# Patient Record
Sex: FEMALE | Race: BLACK OR AFRICAN AMERICAN | NOT HISPANIC OR LATINO
[De-identification: names, ages, dates, MRNs, and addresses within clinical notes are randomized per-mention and may not be internally consistent; named-entity substitution may affect disease eponyms.]

---

## 2017-02-23 ENCOUNTER — APPOINTMENT (OUTPATIENT)
Dept: ENDOCRINOLOGY | Facility: CLINIC | Age: 57
End: 2017-02-23

## 2017-02-23 VITALS
BODY MASS INDEX: 34.55 KG/M2 | DIASTOLIC BLOOD PRESSURE: 84 MMHG | WEIGHT: 195 LBS | HEART RATE: 80 BPM | HEIGHT: 63 IN | SYSTOLIC BLOOD PRESSURE: 140 MMHG

## 2017-02-23 RX ORDER — LANCETS 33 GAUGE
EACH MISCELLANEOUS
Qty: 100 | Refills: 5 | Status: ACTIVE | COMMUNITY
Start: 2017-02-23 | End: 1900-01-01

## 2017-02-23 RX ORDER — BLOOD SUGAR DIAGNOSTIC
STRIP MISCELLANEOUS TWICE DAILY
Qty: 1 | Refills: 5 | Status: ACTIVE | COMMUNITY
Start: 2017-02-23 | End: 1900-01-01

## 2017-09-07 ENCOUNTER — RX RENEWAL (OUTPATIENT)
Age: 57
End: 2017-09-07

## 2017-10-09 ENCOUNTER — APPOINTMENT (OUTPATIENT)
Dept: ENDOCRINOLOGY | Facility: CLINIC | Age: 57
End: 2017-10-09
Payer: COMMERCIAL

## 2017-10-09 VITALS
BODY MASS INDEX: 35.79 KG/M2 | WEIGHT: 202 LBS | DIASTOLIC BLOOD PRESSURE: 78 MMHG | SYSTOLIC BLOOD PRESSURE: 132 MMHG | HEIGHT: 63 IN | HEART RATE: 78 BPM

## 2017-10-09 LAB — GLUCOSE BLDC GLUCOMTR-MCNC: 101

## 2017-10-09 PROCEDURE — 99214 OFFICE O/P EST MOD 30 MIN: CPT | Mod: 25

## 2017-10-09 PROCEDURE — 82962 GLUCOSE BLOOD TEST: CPT

## 2017-11-10 ENCOUNTER — MOBILE ON CALL (OUTPATIENT)
Age: 57
End: 2017-11-10

## 2018-03-16 ENCOUNTER — RX RENEWAL (OUTPATIENT)
Age: 58
End: 2018-03-16

## 2018-04-09 ENCOUNTER — APPOINTMENT (OUTPATIENT)
Dept: ENDOCRINOLOGY | Facility: CLINIC | Age: 58
End: 2018-04-09
Payer: COMMERCIAL

## 2018-04-09 ENCOUNTER — TRANSCRIPTION ENCOUNTER (OUTPATIENT)
Age: 58
End: 2018-04-09

## 2018-04-09 VITALS
BODY MASS INDEX: 36.14 KG/M2 | HEART RATE: 79 BPM | DIASTOLIC BLOOD PRESSURE: 82 MMHG | WEIGHT: 204 LBS | HEIGHT: 63 IN | SYSTOLIC BLOOD PRESSURE: 145 MMHG | OXYGEN SATURATION: 97 %

## 2018-04-09 LAB — GLUCOSE BLDC GLUCOMTR-MCNC: 83

## 2018-04-09 PROCEDURE — 82962 GLUCOSE BLOOD TEST: CPT

## 2018-04-09 PROCEDURE — 99214 OFFICE O/P EST MOD 30 MIN: CPT | Mod: 25

## 2018-06-12 ENCOUNTER — RX RENEWAL (OUTPATIENT)
Age: 58
End: 2018-06-12

## 2018-10-08 ENCOUNTER — MEDICATION RENEWAL (OUTPATIENT)
Age: 58
End: 2018-10-08

## 2019-01-17 ENCOUNTER — APPOINTMENT (OUTPATIENT)
Dept: ENDOCRINOLOGY | Facility: CLINIC | Age: 59
End: 2019-01-17
Payer: COMMERCIAL

## 2019-01-17 VITALS
SYSTOLIC BLOOD PRESSURE: 139 MMHG | WEIGHT: 206 LBS | OXYGEN SATURATION: 99 % | DIASTOLIC BLOOD PRESSURE: 74 MMHG | BODY MASS INDEX: 36.5 KG/M2 | HEART RATE: 84 BPM | HEIGHT: 63 IN

## 2019-01-17 LAB — GLUCOSE BLDC GLUCOMTR-MCNC: 88

## 2019-01-17 PROCEDURE — 82962 GLUCOSE BLOOD TEST: CPT

## 2019-01-17 PROCEDURE — 99214 OFFICE O/P EST MOD 30 MIN: CPT | Mod: 25

## 2019-01-17 NOTE — ASSESSMENT
[Carbohydrate Consistent Diet] : carbohydrate consistent diet [Importance of Diet and Exercise] : importance of diet and exercise to improve glycemic control, achieve weight loss and improve cardiovascular health [Levothyroxine] : The patient was instructed to take Levothyroxine on an empty stomach, separate from vitamins, and wait at least 30 minutes before eating [FreeTextEntry1] :  She will remain on the same dose of Synthroid and continue taking vitamin D every 2 weeks. We did talk about the fact that her A1c was 6.4.which is  significant  for impaired glucose tolerance. She'll continue to follow a healthy lifestyle to try to incorporate more exercise and be more careful with her carbohydrate intake.We did discuss GLP 1 agonists as a help for weight loss or starting metformin; she prefers to hold off any meds.  \par Will to low dose DST to r/o [place].

## 2019-01-17 NOTE — PHYSICAL EXAM
[Alert] : alert [No Acute Distress] : no acute distress [Well Nourished] : well nourished [Well Developed] : well developed [Normal Sclera/Conjunctiva] : normal sclera/conjunctiva [EOMI] : extra ocular movement intact [No Proptosis] : no proptosis [Normal Oropharynx] : the oropharynx was normal [Thyroid Not Enlarged] : the thyroid was not enlarged [No Thyroid Nodules] : there were no palpable thyroid nodules [No Respiratory Distress] : no respiratory distress [No Accessory Muscle Use] : no accessory muscle use [Clear to Auscultation] : lungs were clear to auscultation bilaterally [Normal Rate] : heart rate was normal  [Normal S1, S2] : normal S1 and S2 [Regular Rhythm] : with a regular rhythm [Pedal Pulses Normal] : the pedal pulses are present [No Edema] : there was no peripheral edema [Normal Bowel Sounds] : normal bowel sounds [Not Tender] : non-tender [Soft] : abdomen soft [Not Distended] : not distended [Normal Gait] : normal gait [Normal Strength/Tone] : muscle strength and tone were normal [No Rash] : no rash [Normal Reflexes] : deep tendon reflexes were 2+ and symmetric [No Tremors] : no tremors [Oriented x3] : oriented to person, place, and time [Acanthosis Nigricans] : no acanthosis nigricans [de-identified] : dorsal hump

## 2019-01-17 NOTE — HISTORY OF PRESENT ILLNESS
[FreeTextEntry1] : Patient with hypothyroidism, she is compliant with her medication.She is also taking vitamin D supplementation every 2 weeks. 6 months ago her current A1c was 6.0 and  since then she has been more careful with her diet especially of carbohydrate intake since then.She tries to follow a healthy lifestyle.However her present A1c is now  6.4. Her thyroid level was normal.

## 2019-01-23 ENCOUNTER — APPOINTMENT (OUTPATIENT)
Dept: SPINE | Facility: CLINIC | Age: 59
End: 2019-01-23

## 2019-04-19 ENCOUNTER — RX RENEWAL (OUTPATIENT)
Age: 59
End: 2019-04-19

## 2019-07-08 ENCOUNTER — MEDICATION RENEWAL (OUTPATIENT)
Age: 59
End: 2019-07-08

## 2019-07-08 ENCOUNTER — RX RENEWAL (OUTPATIENT)
Age: 59
End: 2019-07-08

## 2019-09-12 ENCOUNTER — APPOINTMENT (OUTPATIENT)
Dept: ENDOCRINOLOGY | Facility: CLINIC | Age: 59
End: 2019-09-12
Payer: COMMERCIAL

## 2019-09-12 VITALS
WEIGHT: 198.13 LBS | BODY MASS INDEX: 35.11 KG/M2 | SYSTOLIC BLOOD PRESSURE: 100 MMHG | HEIGHT: 63 IN | DIASTOLIC BLOOD PRESSURE: 74 MMHG | HEART RATE: 91 BPM | OXYGEN SATURATION: 95 %

## 2019-09-12 PROCEDURE — 99214 OFFICE O/P EST MOD 30 MIN: CPT

## 2019-09-12 NOTE — PHYSICAL EXAM
[Alert] : alert [Well Nourished] : well nourished [No Acute Distress] : no acute distress [Normal Sclera/Conjunctiva] : normal sclera/conjunctiva [Well Developed] : well developed [No Proptosis] : no proptosis [EOMI] : extra ocular movement intact [Normal Oropharynx] : the oropharynx was normal [Thyroid Not Enlarged] : the thyroid was not enlarged [No Respiratory Distress] : no respiratory distress [No Thyroid Nodules] : there were no palpable thyroid nodules [Clear to Auscultation] : lungs were clear to auscultation bilaterally [No Accessory Muscle Use] : no accessory muscle use [Normal Rate] : heart rate was normal  [Normal S1, S2] : normal S1 and S2 [Regular Rhythm] : with a regular rhythm [No Edema] : there was no peripheral edema [Pedal Pulses Normal] : the pedal pulses are present [Normal Bowel Sounds] : normal bowel sounds [Not Tender] : non-tender [Not Distended] : not distended [Soft] : abdomen soft [Normal Gait] : normal gait [Normal Strength/Tone] : muscle strength and tone were normal [No Rash] : no rash [Acanthosis Nigricans] : no acanthosis nigricans [No Tremors] : no tremors [Normal Reflexes] : deep tendon reflexes were 2+ and symmetric [Oriented x3] : oriented to person, place, and time [de-identified] : dorsal hump

## 2019-09-12 NOTE — ASSESSMENT
[FreeTextEntry1] :  She will remain on the same dose of Synthroid and continue taking vitamin D every 2 weeks. We did talk about the fact that her A1c was 6.1.which is c/w  impaired glucose tolerance. She'll continue to follow a healthy lifestyle to try to incorporate more exercise and be more careful with her carbohydrate intake.We did discuss GLP 1 agonists as a help for weight loss or starting metformin; she prefers to hold off any meds.  Will monitor FBS periodically. \par  [Importance of Diet and Exercise] : importance of diet and exercise to improve glycemic control, achieve weight loss and improve cardiovascular health [Carbohydrate Consistent Diet] : carbohydrate consistent diet [Levothyroxine] : The patient was instructed to take Levothyroxine on an empty stomach, separate from vitamins, and wait at least 30 minutes before eating

## 2019-09-12 NOTE — HISTORY OF PRESENT ILLNESS
[FreeTextEntry1] : Patient with hypothyroidism, she is compliant with her medication.She is also taking vitamin D supplementation every 2 weeks. 6 months ago her current A1c was 6.4 and  since then she has been more careful with her diet especially of carbohydrate intake since then.She tries to follow a healthy lifestyle.Her present A1c is now  6.1. Her thyroid level was normal. she has lost some weight eater less carbs.

## 2020-03-12 ENCOUNTER — APPOINTMENT (OUTPATIENT)
Dept: ENDOCRINOLOGY | Facility: CLINIC | Age: 60
End: 2020-03-12
Payer: COMMERCIAL

## 2020-03-12 VITALS
OXYGEN SATURATION: 95 % | HEART RATE: 84 BPM | SYSTOLIC BLOOD PRESSURE: 142 MMHG | WEIGHT: 197 LBS | DIASTOLIC BLOOD PRESSURE: 81 MMHG | HEIGHT: 63 IN | BODY MASS INDEX: 34.91 KG/M2

## 2020-03-12 LAB — GLUCOSE BLDC GLUCOMTR-MCNC: 101

## 2020-03-12 PROCEDURE — 99213 OFFICE O/P EST LOW 20 MIN: CPT | Mod: 25

## 2020-03-12 PROCEDURE — 82962 GLUCOSE BLOOD TEST: CPT | Mod: NC

## 2020-03-12 NOTE — PHYSICAL EXAM
[Alert] : alert [No Acute Distress] : no acute distress [Well Nourished] : well nourished [Well Developed] : well developed [Normal Sclera/Conjunctiva] : normal sclera/conjunctiva [EOMI] : extra ocular movement intact [No Proptosis] : no proptosis [Normal Oropharynx] : the oropharynx was normal [Thyroid Not Enlarged] : the thyroid was not enlarged [No Thyroid Nodules] : there were no palpable thyroid nodules [No Respiratory Distress] : no respiratory distress [No Accessory Muscle Use] : no accessory muscle use [Clear to Auscultation] : lungs were clear to auscultation bilaterally [Normal Rate] : heart rate was normal  [Normal S1, S2] : normal S1 and S2 [Regular Rhythm] : with a regular rhythm [Pedal Pulses Normal] : the pedal pulses are present [No Edema] : there was no peripheral edema [Normal Bowel Sounds] : normal bowel sounds [Not Tender] : non-tender [Soft] : abdomen soft [Not Distended] : not distended [Normal Gait] : normal gait [Normal Strength/Tone] : muscle strength and tone were normal [No Rash] : no rash [Acanthosis Nigricans] : no acanthosis nigricans [Normal Reflexes] : deep tendon reflexes were 2+ and symmetric [No Tremors] : no tremors [Oriented x3] : oriented to person, place, and time [de-identified] : dorsal hump

## 2020-03-12 NOTE — ASSESSMENT
[FreeTextEntry1] :  She will remain on the same dose of Synthroid and continue taking vitamin D every 2 weeks. We did talk about the fact that her A1c was 6.1.which is c/w  impaired glucose tolerance. She'll continue to follow a healthy lifestyle to try to incorporate more exercise and be more careful with her carbohydrate intake.We did discuss GLP 1 agonists as a help for weight loss or starting metformin; she prefers to hold off any meds.  Will monitor FBS periodically. \par  [Carbohydrate Consistent Diet] : carbohydrate consistent diet [Importance of Diet and Exercise] : importance of diet and exercise to improve glycemic control, achieve weight loss and improve cardiovascular health [Levothyroxine] : The patient was instructed to take Levothyroxine on an empty stomach, separate from vitamins, and wait at least 30 minutes before eating

## 2020-04-17 ENCOUNTER — RX RENEWAL (OUTPATIENT)
Age: 60
End: 2020-04-17

## 2020-05-04 ENCOUNTER — RX RENEWAL (OUTPATIENT)
Age: 60
End: 2020-05-04

## 2020-08-17 ENCOUNTER — RX RENEWAL (OUTPATIENT)
Age: 60
End: 2020-08-17

## 2020-09-15 ENCOUNTER — APPOINTMENT (OUTPATIENT)
Dept: ENDOCRINOLOGY | Facility: CLINIC | Age: 60
End: 2020-09-15
Payer: COMMERCIAL

## 2020-09-15 PROCEDURE — 99213 OFFICE O/P EST LOW 20 MIN: CPT | Mod: 95

## 2020-09-15 NOTE — ASSESSMENT
[Carbohydrate Consistent Diet] : carbohydrate consistent diet [Importance of Diet and Exercise] : importance of diet and exercise to improve glycemic control, achieve weight loss and improve cardiovascular health [Levothyroxine] : The patient was instructed to take Levothyroxine on an empty stomach, separate from vitamins, and wait at least 30 minutes before eating [FreeTextEntry1] :  She will remain on the same dose of Synthroid and continue taking vitamin D every 2 weeks. We did talk about the fact that her A1c was 6.2.which is c/w  impaired glucose tolerance. She'll continue to follow a healthy lifestyle to try to incorporate more exercise and be more careful with her carbohydrate intake.We did discuss GLP 1 agonists as a help for weight loss or starting metformin; she prefers to hold off any meds.  Will monitor FBS periodically. \par

## 2020-09-15 NOTE — PHYSICAL EXAM
[Alert] : alert [No Acute Distress] : no acute distress [Normal Voice/Communication] : normal voice communication [Normal Sclera/Conjunctiva] : normal sclera/conjunctiva [No Proptosis] : no proptosis [Thyroid Not Enlarged] : the thyroid was not enlarged [No Respiratory Distress] : no respiratory distress [No Rash] : no rash [Oriented x3] : oriented to person, place, and time [Normal Insight/Judgement] : insight and judgment were intact [de-identified] : Limited exam due to Telehealth visit secondary to Covid pandemic

## 2020-09-15 NOTE — HISTORY OF PRESENT ILLNESS
[Home] : at home, [unfilled] , at the time of the visit. [Medical Office: ([facility])___] : at the medical office located in  [Verbal consent obtained from patient] : the patient, [unfilled] [FreeTextEntry1] : Patient with hypothyroidism, she is compliant with her medication.She is also taking vitamin D supplementation every 2 weeks. 6 months ago her  A1c was 6.4 and  since then she has been more careful with her diet especially of carbohydrate intake since then.She tries to follow a healthy lifestyle.Her present A1c is now  6.2. Her thyroid level was normal. she has lost some weight eating less carbs.

## 2020-09-16 ENCOUNTER — RX RENEWAL (OUTPATIENT)
Age: 60
End: 2020-09-16

## 2020-09-21 ENCOUNTER — RX RENEWAL (OUTPATIENT)
Age: 60
End: 2020-09-21

## 2020-11-20 ENCOUNTER — RX RENEWAL (OUTPATIENT)
Age: 60
End: 2020-11-20

## 2020-11-27 ENCOUNTER — RX RENEWAL (OUTPATIENT)
Age: 60
End: 2020-11-27

## 2021-01-19 ENCOUNTER — APPOINTMENT (OUTPATIENT)
Dept: ENDOCRINOLOGY | Facility: CLINIC | Age: 61
End: 2021-01-19

## 2021-03-08 ENCOUNTER — APPOINTMENT (OUTPATIENT)
Dept: ENDOCRINOLOGY | Facility: CLINIC | Age: 61
End: 2021-03-08
Payer: COMMERCIAL

## 2021-03-08 PROCEDURE — 99213 OFFICE O/P EST LOW 20 MIN: CPT | Mod: 95

## 2021-03-08 RX ORDER — BIMATOPROST 0.1 MG/ML
0.01 SOLUTION/ DROPS OPHTHALMIC
Qty: 2 | Refills: 0 | Status: ACTIVE | COMMUNITY
Start: 2020-12-15

## 2021-03-08 RX ORDER — SODIUM SULFATE, POTASSIUM SULFATE, MAGNESIUM SULFATE 17.5; 3.13; 1.6 G/ML; G/ML; G/ML
17.5-3.13-1.6 SOLUTION, CONCENTRATE ORAL
Qty: 354 | Refills: 0 | Status: ACTIVE | COMMUNITY
Start: 2021-01-15

## 2021-03-08 NOTE — HISTORY OF PRESENT ILLNESS
[FreeTextEntry1] : Patient with hypothyroidism, she is compliant with her medication.She is also taking vitamin D supplementation every 2 weeks. One year ago  her  A1c was 6.4 and  since then she has been more careful with her diet especially of carbohydrate intake since then.She tries to follow a healthy lifestyle.Her present A1c is now  6.2. Her thyroid level was normal. she has lost some weight eating less carbs. Did not have vaccine.

## 2021-03-08 NOTE — ASSESSMENT
[FreeTextEntry1] :  She will remain on the same dose of Synthroid and continue taking vitamin D every 2 weeks. We did talk about the fact that her A1c was 6.2.which is c/w  impaired glucose tolerance. She'll continue to follow a healthy lifestyle to try to incorporate more exercise and be more careful with her carbohydrate intake.We did discuss GLP 1 agonists as a help for weight loss or starting metformin; she prefers to hold off any meds.  Will monitor FBS periodically. \par Meds renewed. f/u 6 months

## 2021-03-08 NOTE — REASON FOR VISIT
[Home] : at home, [unfilled] , at the time of the visit. [Medical Office: ([facility])___] : at the medical office located in  [Verbal consent obtained from patient] : the patient, [unfilled] [Follow - Up] : a follow-up visit [Hypothyroidism] : hypothyroidism [Weight Management/Obesity] : weight management/obesity

## 2021-03-08 NOTE — PHYSICAL EXAM
[Alert] : alert [No Acute Distress] : no acute distress [Normal Voice/Communication] : normal voice communication [Normal Sclera/Conjunctiva] : normal sclera/conjunctiva [No Proptosis] : no proptosis [Thyroid Not Enlarged] : the thyroid was not enlarged [No Respiratory Distress] : no respiratory distress [No Rash] : no rash [Oriented x3] : oriented to person, place, and time [Normal Insight/Judgement] : insight and judgment were intact [de-identified] : Limited exam due to Telehealth visit secondary to Covid pandemic

## 2021-06-17 ENCOUNTER — RX RENEWAL (OUTPATIENT)
Age: 61
End: 2021-06-17

## 2021-09-09 ENCOUNTER — APPOINTMENT (OUTPATIENT)
Dept: ENDOCRINOLOGY | Facility: CLINIC | Age: 61
End: 2021-09-09
Payer: COMMERCIAL

## 2021-09-09 PROCEDURE — 99213 OFFICE O/P EST LOW 20 MIN: CPT | Mod: 95

## 2021-09-09 NOTE — PHYSICAL EXAM
[Alert] : alert [No Acute Distress] : no acute distress [Normal Voice/Communication] : normal voice communication [Normal Sclera/Conjunctiva] : normal sclera/conjunctiva [No Proptosis] : no proptosis [Thyroid Not Enlarged] : the thyroid was not enlarged [No Respiratory Distress] : no respiratory distress [No Rash] : no rash [Oriented x3] : oriented to person, place, and time [Normal Insight/Judgement] : insight and judgment were intact [de-identified] : Limited exam due to Telehealth visit secondary to Covid pandemic

## 2021-09-09 NOTE — ASSESSMENT
[FreeTextEntry1] :  She will remain on the same dose of Synthroid and continue taking vitamin D every 2 weeks. We did talk about the fact that her A1c was 6.2.which is c/w  impaired glucose tolerance. She'll continue to follow a healthy lifestyle to try to incorporate more exercise and be more careful with her carbohydrate intake.We did discuss GLP 1 agonists as a help for weight loss or starting metformin; she prefers to hold off any meds.  Will monitor FBS periodically. \par Meds renewed. f/u 6 months\par New labs still pending. [Carbohydrate Consistent Diet] : carbohydrate consistent diet [Importance of Diet and Exercise] : importance of diet and exercise to improve glycemic control, achieve weight loss and improve cardiovascular health [Self Monitoring of Blood Glucose] : self monitoring of blood glucose [Levothyroxine] : The patient was instructed to take Levothyroxine on an empty stomach, separate from vitamins, and wait at least 30 minutes before eating

## 2021-09-09 NOTE — HISTORY OF PRESENT ILLNESS
[Home] : at home, [unfilled] , at the time of the visit. [Medical Office: ([facility])___] : at the medical office located in  [Verbal consent obtained from patient] : the patient, [unfilled] [FreeTextEntry1] : Patient with hypothyroidism, she is compliant with her medication.She is also taking vitamin D supplementation every 2 weeks. One year ago  her  A1c was 6.4 and  since then she has been more careful with her diet especially of carbohydrate intake since then.She tries to follow a healthy lifestyle.Her last A1C  was  6.2. Her thyroid levels have been normal. she has lost some weight eating less carbs. Did have Covid  vaccine.

## 2021-10-17 ENCOUNTER — RX RENEWAL (OUTPATIENT)
Age: 61
End: 2021-10-17

## 2021-10-18 ENCOUNTER — RX RENEWAL (OUTPATIENT)
Age: 61
End: 2021-10-18

## 2021-12-09 ENCOUNTER — RX RENEWAL (OUTPATIENT)
Age: 61
End: 2021-12-09

## 2022-03-08 ENCOUNTER — RX RENEWAL (OUTPATIENT)
Age: 62
End: 2022-03-08

## 2022-04-18 ENCOUNTER — RX RENEWAL (OUTPATIENT)
Age: 62
End: 2022-04-18

## 2022-05-31 ENCOUNTER — RX RENEWAL (OUTPATIENT)
Age: 62
End: 2022-05-31

## 2022-07-25 ENCOUNTER — RX RENEWAL (OUTPATIENT)
Age: 62
End: 2022-07-25

## 2022-10-25 ENCOUNTER — APPOINTMENT (OUTPATIENT)
Dept: ENDOCRINOLOGY | Facility: CLINIC | Age: 62
End: 2022-10-25

## 2022-10-25 VITALS
OXYGEN SATURATION: 93 % | BODY MASS INDEX: 35.97 KG/M2 | WEIGHT: 203 LBS | HEART RATE: 92 BPM | HEIGHT: 63 IN | SYSTOLIC BLOOD PRESSURE: 144 MMHG | DIASTOLIC BLOOD PRESSURE: 80 MMHG

## 2022-10-25 PROCEDURE — 99214 OFFICE O/P EST MOD 30 MIN: CPT

## 2022-10-25 NOTE — PHYSICAL EXAM
[Alert] : alert [No Acute Distress] : no acute distress [Normal Voice/Communication] : normal voice communication [Normal Sclera/Conjunctiva] : normal sclera/conjunctiva [No Proptosis] : no proptosis [Thyroid Not Enlarged] : the thyroid was not enlarged [No Thyroid Nodules] : no palpable thyroid nodules [No Respiratory Distress] : no respiratory distress [Clear to Auscultation] : lungs were clear to auscultation bilaterally [Normal PMI] : the apical impulse was normal [Normal Rate] : heart rate was normal [No Edema] : no peripheral edema [Normal Bowel Sounds] : normal bowel sounds [Soft] : abdomen soft [No CVA Tenderness] : no ~M costovertebral angle tenderness [Normal Gait] : normal gait [No Rash] : no rash [Normal Reflexes] : deep tendon reflexes were 2+ and symmetric [Oriented x3] : oriented to person, place, and time [Normal Insight/Judgement] : insight and judgment were intact

## 2022-10-25 NOTE — HISTORY OF PRESENT ILLNESS
[FreeTextEntry1] : Patient with hypothyroidism, she is compliant with her medication.She is also taking vitamin D supplementation every 2 weeks. One year ago  her  A1c was 6.4 and  since then she has been more careful with her diet especially of carbohydrate intake since then.She tries to follow a healthy lifestyle. A1C now 6.1. . Her thyroid levels have been normal. she has lost some weight eating less carbs. Did have Covid  vaccine.

## 2022-10-25 NOTE — ASSESSMENT
[FreeTextEntry1] :  She will remain on the same dose of Synthroid and continue taking vitamin D every 2 weeks. We did talk about the fact that her A1c was 6.1.which is c/w  impaired glucose tolerance. She'll continue to follow a healthy lifestyle to try to incorporate more exercise and be more careful with her carbohydrate intake.We did discuss GLP 1 agonists as a help for weight loss or starting metformin; she prefers to hold off any meds.  Will monitor FBS periodically. \par Meds renewed. f/u 6 months\par  [Carbohydrate Consistent Diet] : carbohydrate consistent diet [Importance of Diet and Exercise] : importance of diet and exercise to improve glycemic control, achieve weight loss and improve cardiovascular health [Self Monitoring of Blood Glucose] : self monitoring of blood glucose [Levothyroxine] : The patient was instructed to take Levothyroxine on an empty stomach, separate from vitamins, and wait at least 30 minutes before eating

## 2022-10-31 ENCOUNTER — RX RENEWAL (OUTPATIENT)
Age: 62
End: 2022-10-31

## 2023-04-11 ENCOUNTER — RX RENEWAL (OUTPATIENT)
Age: 63
End: 2023-04-11

## 2023-04-25 ENCOUNTER — APPOINTMENT (OUTPATIENT)
Dept: ENDOCRINOLOGY | Facility: CLINIC | Age: 63
End: 2023-04-25

## 2023-05-08 ENCOUNTER — RX RENEWAL (OUTPATIENT)
Age: 63
End: 2023-05-08

## 2023-06-05 ENCOUNTER — RX RENEWAL (OUTPATIENT)
Age: 63
End: 2023-06-05

## 2023-06-19 ENCOUNTER — RX RENEWAL (OUTPATIENT)
Age: 63
End: 2023-06-19

## 2023-08-08 ENCOUNTER — APPOINTMENT (OUTPATIENT)
Dept: ENDOCRINOLOGY | Facility: CLINIC | Age: 63
End: 2023-08-08
Payer: COMMERCIAL

## 2023-08-08 VITALS
BODY MASS INDEX: 35.79 KG/M2 | HEIGHT: 63 IN | SYSTOLIC BLOOD PRESSURE: 142 MMHG | HEART RATE: 82 BPM | DIASTOLIC BLOOD PRESSURE: 85 MMHG | WEIGHT: 202 LBS | OXYGEN SATURATION: 96 %

## 2023-08-08 DIAGNOSIS — R73.02 IMPAIRED GLUCOSE TOLERANCE (ORAL): ICD-10-CM

## 2023-08-08 DIAGNOSIS — E03.9 HYPOTHYROIDISM, UNSPECIFIED: ICD-10-CM

## 2023-08-08 DIAGNOSIS — E55.9 VITAMIN D DEFICIENCY, UNSPECIFIED: ICD-10-CM

## 2023-08-08 PROCEDURE — 99214 OFFICE O/P EST MOD 30 MIN: CPT

## 2023-08-08 NOTE — HISTORY OF PRESENT ILLNESS
[FreeTextEntry1] : Patient with hypothyroidism, she is compliant with her medication.She is also taking vitamin D supplementation every 2 weeks. One year ago  her  A1c was 6.4 and  since then she has been more careful with her diet especially of carbohydrate intake since then.She tries to follow a healthy lifestyle. A1C now 6.0. . Her thyroid levels have been normal. she has lost some weight eating less carbs. Walks regularly.

## 2023-08-08 NOTE — ASSESSMENT
[Carbohydrate Consistent Diet] : carbohydrate consistent diet [Importance of Diet and Exercise] : importance of diet and exercise to improve glycemic control, achieve weight loss and improve cardiovascular health [Levothyroxine] : The patient was instructed to take Levothyroxine on an empty stomach, separate from vitamins, and wait at least 30 minutes before eating [FreeTextEntry1] :  She will remain on the same dose of Synthroid and continue taking vitamin D every 2 weeks. We did talk about the fact that her A1c was 6.0. which is c/w impaired glucose tolerance. She'll continue to follow a healthy lifestyle to try to incorporate more exercise and be more careful with her carbohydrate intake. We did discuss GLP 1 agonists as a help for weight loss or starting metformin; she prefers to hold off any meds.  Will monitor FBS periodically.  Thyroid TSH slightly suppressed so will skip one pill/mos.  Meds renewed. f/u 6 months

## 2023-08-13 ENCOUNTER — RX RENEWAL (OUTPATIENT)
Age: 63
End: 2023-08-13

## 2023-08-15 ENCOUNTER — RX RENEWAL (OUTPATIENT)
Age: 63
End: 2023-08-15

## 2023-12-11 ENCOUNTER — RX RENEWAL (OUTPATIENT)
Age: 63
End: 2023-12-11

## 2024-01-03 ENCOUNTER — RX RENEWAL (OUTPATIENT)
Age: 64
End: 2024-01-03

## 2024-01-31 ENCOUNTER — RX RENEWAL (OUTPATIENT)
Age: 64
End: 2024-01-31

## 2024-02-06 ENCOUNTER — APPOINTMENT (OUTPATIENT)
Dept: ENDOCRINOLOGY | Facility: CLINIC | Age: 64
End: 2024-02-06

## 2024-02-28 ENCOUNTER — RX RENEWAL (OUTPATIENT)
Age: 64
End: 2024-02-28

## 2024-03-28 ENCOUNTER — RX RENEWAL (OUTPATIENT)
Age: 64
End: 2024-03-28

## 2024-04-24 ENCOUNTER — RX RENEWAL (OUTPATIENT)
Age: 64
End: 2024-04-24

## 2024-04-25 ENCOUNTER — RX RENEWAL (OUTPATIENT)
Age: 64
End: 2024-04-25

## 2024-04-25 ENCOUNTER — NON-APPOINTMENT (OUTPATIENT)
Age: 64
End: 2024-04-25

## 2025-02-24 ENCOUNTER — RX RENEWAL (OUTPATIENT)
Age: 65
End: 2025-02-24